# Patient Record
Sex: MALE | Race: WHITE | NOT HISPANIC OR LATINO
[De-identification: names, ages, dates, MRNs, and addresses within clinical notes are randomized per-mention and may not be internally consistent; named-entity substitution may affect disease eponyms.]

---

## 2017-02-01 ENCOUNTER — APPOINTMENT (OUTPATIENT)
Dept: OTOLARYNGOLOGY | Facility: CLINIC | Age: 27
End: 2017-02-01

## 2017-02-01 VITALS
SYSTOLIC BLOOD PRESSURE: 117 MMHG | HEIGHT: 71 IN | BODY MASS INDEX: 25.2 KG/M2 | HEART RATE: 87 BPM | DIASTOLIC BLOOD PRESSURE: 73 MMHG | WEIGHT: 180 LBS

## 2017-10-24 ENCOUNTER — APPOINTMENT (OUTPATIENT)
Dept: OTOLARYNGOLOGY | Facility: CLINIC | Age: 27
End: 2017-10-24

## 2017-12-27 ENCOUNTER — APPOINTMENT (OUTPATIENT)
Dept: OTOLARYNGOLOGY | Facility: CLINIC | Age: 27
End: 2017-12-27
Payer: SELF-PAY

## 2017-12-27 VITALS
SYSTOLIC BLOOD PRESSURE: 113 MMHG | BODY MASS INDEX: 25.2 KG/M2 | DIASTOLIC BLOOD PRESSURE: 78 MMHG | HEART RATE: 91 BPM | WEIGHT: 180 LBS | HEIGHT: 71 IN

## 2017-12-27 PROCEDURE — 99213 OFFICE O/P EST LOW 20 MIN: CPT

## 2019-02-22 ENCOUNTER — APPOINTMENT (OUTPATIENT)
Dept: OTOLARYNGOLOGY | Facility: CLINIC | Age: 29
End: 2019-02-22

## 2019-02-22 VITALS
BODY MASS INDEX: 25.2 KG/M2 | DIASTOLIC BLOOD PRESSURE: 75 MMHG | HEART RATE: 83 BPM | HEIGHT: 71 IN | WEIGHT: 180 LBS | SYSTOLIC BLOOD PRESSURE: 118 MMHG

## 2019-02-22 NOTE — HISTORY OF PRESENT ILLNESS
[Mastoid] : mastoid surgery [No] : patient does not have a  history of radiation therapy [Ear Fullness] : ear fullness [None] : No risk factors have been identified. [de-identified] : 27 y/o male w/ hx of R radical mastoidectomy who came in for his annual exam. He states his ears feel full. Pt has no ear pain, ear drainage, hearing loss, tinnitus, vertigo, nasal congestion, nasal discharge, epistaxis, sinus infections, facial pain, facial pressure, throat pain, dysphagia or fevers\par  [Tinnitus] : no tinnitus [Anxiety] : no anxiety [Dizziness] : no dizziness [Headache] : no headache [Hearing Loss] : no hearing loss [Otalgia] : no otalgia [Otorrhea] : no otorrhea [Vertigo] : no vertigo [Recurrent Otitis Media] : no recurrent otitis media [Otitis Media with Effusion] : no otitis media with effusion [Presbycusis] : no presbycusis [Congenital Ear Malformation] : no congenital ear malformation [Meniere Disease] : no Meniere disease [Otosclerosis] : no otosclerosis [Perilymphatic Fistula] : no perilymphatic fistula [Hypertension] : no hypertension [Loud Noise Exposure] : no history of loud noise exposure [Smoking] : no smoking [Early Onset Hearing Loss] : no early onset hearing loss [Stroke] : no stroke [Facial Pain] : no facial pain [Facial Pressure] : no facial pressure [Nasal Congestion] : no nasal congestion [Ear Pain] : no ear pain [Diplopia] : no diplopia [Allergic Rhinitis] : no allergic rhinitis [Environmental Allergies] : no environmental allergies [Seasonal Allergies] : no seasonal allergies [Environmental Allergens] : no environmental allergens [Adenoidectomy] : no adenoidectomy [Allergies] : no allergies [Asthma] : no asthma [Neck Mass] : no neck mass [Neck Pain] : no neck pain [Chills] : no chills [Cold Intolerance] : no cold intolerance [Cough] : no cough [Fatigue] : no fatigue [Heat Intolerance] : no heat intolerance [Hyperthyroidism] : no hyperthyroidism [Sialadenitis] : no sialadenitis [Hodgkin Disease] : no hodgkin disease [Non-Hodgkin Lymphoma] : no non-hodgkin lymphoma [Tobacco Use] : no tobacco use [Alcohol Use] : no alcohol use [Graves Disease] : no graves disease [Thyroid Cancer] : no thyroid cancer

## 2019-02-22 NOTE — ASSESSMENT
[FreeTextEntry1] : 27 y/o male w/ hx of R mastoidectomy \par \par R ear debrided\par \par Will f/u in 1 year

## 2019-02-22 NOTE — PHYSICAL EXAM
[Midline] : trachea located in midline position [Normal] : salivary glands are normal [Hearing Loss Right Only] : normal [Hearing Loss Left Only] : normal [de-identified] : wax debridement

## 2020-06-01 ENCOUNTER — APPOINTMENT (OUTPATIENT)
Dept: OTOLARYNGOLOGY | Facility: CLINIC | Age: 30
End: 2020-06-01
Payer: SELF-PAY

## 2020-06-01 VITALS
DIASTOLIC BLOOD PRESSURE: 77 MMHG | HEART RATE: 81 BPM | HEIGHT: 71 IN | BODY MASS INDEX: 26.6 KG/M2 | TEMPERATURE: 98.3 F | SYSTOLIC BLOOD PRESSURE: 122 MMHG | WEIGHT: 190 LBS

## 2020-06-01 PROCEDURE — 99211 OFF/OP EST MAY X REQ PHY/QHP: CPT | Mod: NC

## 2020-06-01 NOTE — ASSESSMENT
[FreeTextEntry1] : 28 y/o male w/ hx of R mastoidectomy \par \par Left ETD \par -rx: advil cold and sinus x 7 days \par \par f/u prn

## 2020-06-01 NOTE — PHYSICAL EXAM
[Midline] : trachea located in midline position [Hearing Loss Right Only] : normal [Hearing Loss Left Only] : normal [Normal] : gait was normal [Nystagmus] : ~T no ~M nystagmus was seen [Fukuda Step Test] : Fukuda Step Test was Negative [Geri-Hallmauricioke] : [place]-Hallpike: Negative

## 2020-06-01 NOTE — HISTORY OF PRESENT ILLNESS
[Mastoid] : mastoid surgery [No] : patient does not have a  history of radiation therapy [Ear Fullness] : ear fullness [None] : No risk factors have been identified. [de-identified] : 28 y/o male w/ hx of R radical mastoidectomy Complains of left sided ear popping x 2 months, states its making him feel off balance. Laying down and closing his eyes makes it better.  Otherwise doing fine. Pt has no ear pain, ear drainage, hearing loss, tinnitus, vertigo, nasal congestion, nasal discharge, epistaxis, sinus infections, facial pain, facial pressure, throat pain, dysphagia or fevers\par  [Tinnitus] : no tinnitus [Anxiety] : no anxiety [Headache] : no headache [Dizziness] : no dizziness [Otalgia] : no otalgia [Hearing Loss] : no hearing loss [Recurrent Otitis Media] : no recurrent otitis media [Otorrhea] : no otorrhea [Vertigo] : no vertigo [Otitis Media with Effusion] : no otitis media with effusion [Congenital Ear Malformation] : no congenital ear malformation [Presbycusis] : no presbycusis [Meniere Disease] : no Meniere disease [Perilymphatic Fistula] : no perilymphatic fistula [Otosclerosis] : no otosclerosis [Loud Noise Exposure] : no history of loud noise exposure [Hypertension] : no hypertension [Smoking] : no smoking [Early Onset Hearing Loss] : no early onset hearing loss [Stroke] : no stroke [Facial Pain] : no facial pain [Nasal Congestion] : no nasal congestion [Facial Pressure] : no facial pressure [Ear Pain] : no ear pain [Allergic Rhinitis] : no allergic rhinitis [Diplopia] : no diplopia [Seasonal Allergies] : no seasonal allergies [Environmental Allergies] : no environmental allergies [Environmental Allergens] : no environmental allergens [Adenoidectomy] : no adenoidectomy [Allergies] : no allergies [Asthma] : no asthma [Neck Mass] : no neck mass [Neck Pain] : no neck pain [Cold Intolerance] : no cold intolerance [Chills] : no chills [Cough] : no cough [Fatigue] : no fatigue [Heat Intolerance] : no heat intolerance [Hyperthyroidism] : no hyperthyroidism [Sialadenitis] : no sialadenitis [Non-Hodgkin Lymphoma] : no non-hodgkin lymphoma [Hodgkin Disease] : no hodgkin disease [Tobacco Use] : no tobacco use [Graves Disease] : no graves disease [Alcohol Use] : no alcohol use [Thyroid Cancer] : no thyroid cancer

## 2020-09-04 ENCOUNTER — APPOINTMENT (OUTPATIENT)
Dept: OTOLARYNGOLOGY | Facility: CLINIC | Age: 30
End: 2020-09-04
Payer: SELF-PAY

## 2020-09-04 VITALS
TEMPERATURE: 98.1 F | DIASTOLIC BLOOD PRESSURE: 74 MMHG | BODY MASS INDEX: 26.6 KG/M2 | SYSTOLIC BLOOD PRESSURE: 114 MMHG | HEIGHT: 71 IN | WEIGHT: 190 LBS | HEART RATE: 79 BPM

## 2020-09-04 PROCEDURE — 99211 OFF/OP EST MAY X REQ PHY/QHP: CPT

## 2020-09-04 RX ORDER — METHYLPREDNISOLONE 4 MG/1
4 TABLET ORAL
Qty: 21 | Refills: 0 | Status: ACTIVE | COMMUNITY
Start: 2020-09-04 | End: 1900-01-01

## 2020-09-05 NOTE — ASSESSMENT
[FreeTextEntry1] : 30 y/o male w/ hx of R mastoidectomy \par \par DNS and ETD\par Rx: hypertonic saline nasal irrigations \par Rx: Medrol dose pack \par \par f/u prn

## 2020-09-05 NOTE — PHYSICAL EXAM
[Midline] : trachea located in midline position [] : septum deviated bilaterally [de-identified] : congested [Normal] : the right external canal was normal [Hearing Loss Right Only] : normal [Hearing Loss Left Only] : normal [Nystagmus] : ~T no ~M nystagmus was seen [Fukuda Step Test] : Fukuda Step Test was Negative [Geri-Hallmauricioke] : [place]-Hallpike: Negative [de-identified] : nitza TRIVEDI

## 2020-09-05 NOTE — HISTORY OF PRESENT ILLNESS
[Mastoid] : mastoid surgery [No] : patient does not have a  history of radiation therapy [Ear Fullness] : ear fullness [None] : No risk factors have been identified. [de-identified] : 30 y/o male w/ hx of R radical mastoidectomy Complains of left sided ear popping x 2 months, states its making him feel off balance. Laying down and closing his eyes makes it better.  Otherwise doing fine. Pt has no ear pain, ear drainage, hearing loss, tinnitus, vertigo, nasal congestion, nasal discharge, epistaxis, sinus infections, facial pain, facial pressure, throat pain, dysphagia or fevers\par  [FreeTextEntry1] : Patient presents for follow up, states he continues to have ear popping and feeling off balance. Has tried advil cold and sinus with no relief. \par no other modifying factors\par \par  [Tinnitus] : no tinnitus [Anxiety] : no anxiety [Dizziness] : no dizziness [Headache] : no headache [Hearing Loss] : no hearing loss [Otalgia] : no otalgia [Otorrhea] : no otorrhea [Vertigo] : no vertigo [Recurrent Otitis Media] : no recurrent otitis media [Otitis Media with Effusion] : no otitis media with effusion [Presbycusis] : no presbycusis [Congenital Ear Malformation] : no congenital ear malformation [Meniere Disease] : no Meniere disease [Otosclerosis] : no otosclerosis [Perilymphatic Fistula] : no perilymphatic fistula [Hypertension] : no hypertension [Loud Noise Exposure] : no history of loud noise exposure [Smoking] : no smoking [Early Onset Hearing Loss] : no early onset hearing loss [Stroke] : no stroke [Facial Pain] : no facial pain [Facial Pressure] : no facial pressure [Nasal Congestion] : no nasal congestion [Ear Pain] : no ear pain [Diplopia] : no diplopia [Allergic Rhinitis] : no allergic rhinitis [Environmental Allergies] : no environmental allergies [Seasonal Allergies] : no seasonal allergies [Environmental Allergens] : no environmental allergens [Adenoidectomy] : no adenoidectomy [Allergies] : no allergies [Asthma] : no asthma [Neck Mass] : no neck mass [Neck Pain] : no neck pain [Chills] : no chills [Cold Intolerance] : no cold intolerance [Cough] : no cough [Fatigue] : no fatigue [Heat Intolerance] : no heat intolerance [Hyperthyroidism] : no hyperthyroidism [Sialadenitis] : no sialadenitis [Hodgkin Disease] : no hodgkin disease [Non-Hodgkin Lymphoma] : no non-hodgkin lymphoma [Tobacco Use] : no tobacco use [Alcohol Use] : no alcohol use [Graves Disease] : no graves disease [Thyroid Cancer] : no thyroid cancer

## 2020-12-28 ENCOUNTER — APPOINTMENT (OUTPATIENT)
Dept: OTOLARYNGOLOGY | Facility: CLINIC | Age: 30
End: 2020-12-28

## 2021-03-26 ENCOUNTER — APPOINTMENT (OUTPATIENT)
Dept: OTOLARYNGOLOGY | Facility: CLINIC | Age: 31
End: 2021-03-26
Payer: SELF-PAY

## 2021-03-26 VITALS
DIASTOLIC BLOOD PRESSURE: 71 MMHG | HEIGHT: 71 IN | SYSTOLIC BLOOD PRESSURE: 117 MMHG | TEMPERATURE: 98 F | HEART RATE: 78 BPM | WEIGHT: 180 LBS | BODY MASS INDEX: 25.2 KG/M2

## 2021-03-26 DIAGNOSIS — R42 DIZZINESS AND GIDDINESS: ICD-10-CM

## 2021-03-26 PROCEDURE — 99211 OFF/OP EST MAY X REQ PHY/QHP: CPT

## 2021-03-26 NOTE — ASSESSMENT
[FreeTextEntry1] : 28 y/o male w/ hx of R mastoidectomy w/ left aural fullness and imbalance\par \par DNS and ETD\par Rx:Flonase\par \par f/u 6 months\par charged this time\par n/c next time\par consider CT PNS\par consider VNG

## 2021-03-26 NOTE — PHYSICAL EXAM
[] : septum deviated bilaterally [Midline] : trachea located in midline position [Normal] : gait was normal [de-identified] : congested [Hearing Loss Right Only] : normal [Hearing Loss Left Only] : normal [Nystagmus] : ~T no ~M nystagmus was seen [Fukuda Step Test] : Fukuda Step Test was Negative [Geri-Hallmauricioke] : [place]-Hallpike: Negative [de-identified] : nitza TRIVEDI

## 2021-03-26 NOTE — HISTORY OF PRESENT ILLNESS
[Mastoid] : mastoid surgery [No] : patient does not have a  history of radiation therapy [Ear Fullness] : ear fullness [None] : No risk factors have been identified. [de-identified] : 28 y/o male w/ hx of R radical mastoidectomy Complains of left sided ear popping x 2 months, states its making him feel off balance. Laying down and closing his eyes makes it better.  Otherwise doing fine. Pt has no ear pain, ear drainage, hearing loss, tinnitus, vertigo, nasal congestion, nasal discharge, epistaxis, sinus infections, facial pain, facial pressure, throat pain, dysphagia or fevers\par  [FreeTextEntry1] : March 26, 2021\par Patient presents for follow up, states he continues to have ear popping and feeling off balance. Has tried advil cold and sinus with no relief. \par no other modifying factors\par \par  [Tinnitus] : no tinnitus [Anxiety] : no anxiety [Dizziness] : no dizziness [Headache] : no headache [Hearing Loss] : no hearing loss [Otalgia] : no otalgia [Otorrhea] : no otorrhea [Vertigo] : no vertigo [Recurrent Otitis Media] : no recurrent otitis media [Otitis Media with Effusion] : no otitis media with effusion [Presbycusis] : no presbycusis [Congenital Ear Malformation] : no congenital ear malformation [Meniere Disease] : no Meniere disease [Otosclerosis] : no otosclerosis [Perilymphatic Fistula] : no perilymphatic fistula [Hypertension] : no hypertension [Loud Noise Exposure] : no history of loud noise exposure [Smoking] : no smoking [Early Onset Hearing Loss] : no early onset hearing loss [Stroke] : no stroke [Facial Pain] : no facial pain [Facial Pressure] : no facial pressure [Nasal Congestion] : no nasal congestion [Ear Pain] : no ear pain [Diplopia] : no diplopia [Allergic Rhinitis] : no allergic rhinitis [Environmental Allergies] : no environmental allergies [Seasonal Allergies] : no seasonal allergies [Environmental Allergens] : no environmental allergens [Adenoidectomy] : no adenoidectomy [Allergies] : no allergies [Asthma] : no asthma [Neck Mass] : no neck mass [Neck Pain] : no neck pain [Chills] : no chills [Cold Intolerance] : no cold intolerance [Cough] : no cough [Fatigue] : no fatigue [Heat Intolerance] : no heat intolerance [Hyperthyroidism] : no hyperthyroidism [Sialadenitis] : no sialadenitis [Hodgkin Disease] : no hodgkin disease [Non-Hodgkin Lymphoma] : no non-hodgkin lymphoma [Tobacco Use] : no tobacco use [Alcohol Use] : no alcohol use [Graves Disease] : no graves disease [Thyroid Cancer] : no thyroid cancer

## 2023-02-10 ENCOUNTER — NON-APPOINTMENT (OUTPATIENT)
Age: 33
End: 2023-02-10

## 2023-02-10 ENCOUNTER — APPOINTMENT (OUTPATIENT)
Dept: OTOLARYNGOLOGY | Facility: CLINIC | Age: 33
End: 2023-02-10
Payer: COMMERCIAL

## 2023-02-10 VITALS
WEIGHT: 185 LBS | BODY MASS INDEX: 25.9 KG/M2 | SYSTOLIC BLOOD PRESSURE: 119 MMHG | HEART RATE: 86 BPM | HEIGHT: 71 IN | TEMPERATURE: 98.3 F | DIASTOLIC BLOOD PRESSURE: 71 MMHG

## 2023-02-10 DIAGNOSIS — H90.3 SENSORINEURAL HEARING LOSS, BILATERAL: ICD-10-CM

## 2023-02-10 DIAGNOSIS — H69.82 OTHER SPECIFIED DISORDERS OF EUSTACHIAN TUBE, LEFT EAR: ICD-10-CM

## 2023-02-10 DIAGNOSIS — H70.11 CHRONIC MASTOIDITIS, RIGHT EAR: ICD-10-CM

## 2023-02-10 DIAGNOSIS — H61.23 IMPACTED CERUMEN, BILATERAL: ICD-10-CM

## 2023-02-10 PROCEDURE — 99213 OFFICE O/P EST LOW 20 MIN: CPT | Mod: 25

## 2023-02-10 PROCEDURE — 69220 CLEAN OUT MASTOID CAVITY: CPT | Mod: RT

## 2023-02-10 PROCEDURE — 92557 COMPREHENSIVE HEARING TEST: CPT

## 2023-02-10 PROCEDURE — 92504 EAR MICROSCOPY EXAMINATION: CPT | Mod: 59

## 2023-02-10 PROCEDURE — 92567 TYMPANOMETRY: CPT

## 2023-02-10 NOTE — PHYSICAL EXAM
[Midline] : trachea located in midline position [Normal] : the right external canal was normal [Hearing Loss Right Only] : normal [Hearing Loss Left Only] : normal [FreeTextEntry8] : wax [de-identified] : dry wax

## 2023-02-10 NOTE — PROCEDURE
[FreeTextEntry3] : After informed verbal consent is obtained, cerumen is removed from the right and left ear canal with a curette and suction.\par  [Risk and Benefits Discussed] : The purpose, risks, discomforts, benefits and alternatives of the procedure have been explained to the patient including no treatment. [Cerumen Impaction] : Cerumen Impaction [Same] : same as the Pre Op Dx. [] : Removal of Cerumen [FreeTextEntry1] : right chr mastoiditis now w/ dizziness [FreeTextEntry6] : right mastoid debrided w/ suction\par no infection or cholesteatoma\par the microscope was necessary for illumination and magnification\par

## 2023-02-10 NOTE — END OF VISIT
[FreeTextEntry3] : I personally saw and examined ORIANA CANSECO in detail. I spoke to MIKEY Diaz regarding the assessment and plan of care. I reviewed the above assessment and plan of care, and agree. I have made changes in changes in the body of the note where appropriate.I personally reviewed the HPI, PMH, FH, SH, ROS and medications/allergies. I have spoken to MIKEY Diaz regarding the history and have personally determined the assessment and plan of care, and documented this myself. I was present and participated in all key portions of the encounter and all procedures noted above. I have made changes in the body of the note where appropriate.\par \par Attesting Faculty: See Attending Signature Below \par \par \par  [Time Spent: ___ minutes] : I have spent [unfilled] minutes of time on the encounter.

## 2023-02-10 NOTE — HISTORY OF PRESENT ILLNESS
[T plasty] : tympanoplasty [Mastoid] : mastoid surgery [No] : patient does not have a  history of radiation therapy [de-identified] : 33 yo male\par PAtient with hx of R radical mastoidectomy complains of R ear fullness and dizziness. Here for routine ear cleaning. Pt has no ear pain, ear drainage, hearing loss, tinnitus, nasal congestion, nasal discharge, epistaxis, sinus infections, facial pain, facial pressure, throat pain, dysphagia or fevers\par c/o recent vertigo x days [Vertigo] : vertigo [Hearing Loss] : hearing loss [Nasal Congestion] : no nasal congestion [Ear Fullness] : no ear fullness [Allergic Rhinitis] : no allergic rhinitis [Adenoidectomy] : no adenoidectomy [Allergies] : no allergies [Asthma] : no asthma [None] : The patient is currently asymptomatic.

## 2023-02-10 NOTE — ASSESSMENT
[FreeTextEntry1] : Mr. CANSECO 32 year M w/ hx of R radical mastoidectomy complains of R ear fullness and dizziness. Here for routine ear cleaning \par \par Right Mastoid:\par -Debrided R ear, now clean and dry \par -Advised for H2O precautions to avoid dizziness \par -Hearing Test performed to evaluate the extent of hearing loss and to explain pt's symptoms\par consider [facility] or [facility] or poss CI\par \par \par f/u prn and 6 months\par \par